# Patient Record
Sex: FEMALE | Race: OTHER | Employment: UNEMPLOYED | URBAN - METROPOLITAN AREA
[De-identification: names, ages, dates, MRNs, and addresses within clinical notes are randomized per-mention and may not be internally consistent; named-entity substitution may affect disease eponyms.]

---

## 2023-03-17 ENCOUNTER — APPOINTMENT (OUTPATIENT)
Dept: ULTRASOUND IMAGING | Age: 38
End: 2023-03-17
Attending: EMERGENCY MEDICINE

## 2023-03-17 ENCOUNTER — HOSPITAL ENCOUNTER (EMERGENCY)
Age: 38
Discharge: HOME | End: 2023-03-17
Attending: EMERGENCY MEDICINE

## 2023-03-17 ENCOUNTER — APPOINTMENT (OUTPATIENT)
Dept: CT IMAGING | Age: 38
End: 2023-03-17
Attending: EMERGENCY MEDICINE

## 2023-03-17 VITALS
RESPIRATION RATE: 16 BRPM | SYSTOLIC BLOOD PRESSURE: 153 MMHG | HEIGHT: 65 IN | OXYGEN SATURATION: 99 % | WEIGHT: 190 LBS | BODY MASS INDEX: 31.65 KG/M2 | TEMPERATURE: 98.8 F | HEART RATE: 79 BPM | DIASTOLIC BLOOD PRESSURE: 88 MMHG

## 2023-03-17 DIAGNOSIS — R10.31 ABDOMINAL PAIN, RIGHT LOWER QUADRANT: ICD-10-CM

## 2023-03-17 DIAGNOSIS — D25.9 UTERINE LEIOMYOMA, UNSPECIFIED LOCATION: Primary | ICD-10-CM

## 2023-03-17 DIAGNOSIS — N83.202 CYST OF LEFT OVARY: ICD-10-CM

## 2023-03-17 LAB
ALBUMIN SERPL-MCNC: 4.6 G/DL (ref 3.5–5.2)
ALBUMIN/GLOB SERPL: 1.2 (ref 1.1–2.2)
ALP SERPL-CCNC: 80 U/L (ref 35–104)
ALT SERPL-CCNC: 15 U/L (ref 10–35)
ANION GAP SERPL CALC-SCNC: 12 MMOL/L (ref 5–15)
AST SERPL-CCNC: 19 U/L (ref 10–35)
BASOPHILS # BLD: 0 K/UL (ref 0–1)
BASOPHILS NFR BLD: 0 % (ref 0–1)
BILIRUB SERPL-MCNC: 0.2 MG/DL (ref 0.2–1)
BUN SERPL-MCNC: 10 MG/DL (ref 6–20)
BUN/CREAT SERPL: 17 (ref 12–20)
CALCIUM SERPL-MCNC: 9.8 MG/DL (ref 8.6–10)
CHLORIDE SERPL-SCNC: 98 MMOL/L (ref 98–107)
CO2 SERPL-SCNC: 27 MMOL/L (ref 22–29)
COMMENT, HOLDF: NORMAL
COMMENT, HOLDF: NORMAL
CREAT SERPL-MCNC: 0.6 MG/DL (ref 0.5–0.9)
DIFFERENTIAL METHOD BLD: ABNORMAL
EOSINOPHIL # BLD: 0 K/UL (ref 0–0.4)
EOSINOPHIL NFR BLD: 0 %
ERYTHROCYTE [DISTWIDTH] IN BLOOD BY AUTOMATED COUNT: 13.9 % (ref 11.5–14.5)
GLOBULIN SER CALC-MCNC: 3.7 G/DL (ref 2–4)
GLUCOSE SERPL-MCNC: 98 MG/DL (ref 65–100)
HCG UR QL: NEGATIVE
HCT VFR BLD AUTO: 39.4 % (ref 35–47)
HGB BLD-MCNC: 12.8 G/DL (ref 11.5–16)
IMM GRANULOCYTES # BLD AUTO: 0 K/UL (ref 0–0.04)
IMM GRANULOCYTES NFR BLD AUTO: 0 % (ref 0–0.5)
LIPASE SERPL-CCNC: 25 U/L (ref 13–60)
LYMPHOCYTES # BLD: 2.5 K/UL (ref 0.8–3.5)
LYMPHOCYTES NFR BLD: 28 % (ref 12–49)
MCH RBC QN AUTO: 29.2 PG (ref 26–34)
MCHC RBC AUTO-ENTMCNC: 32.5 G/DL (ref 30–36.5)
MCV RBC AUTO: 89.7 FL (ref 80–99)
MONOCYTES # BLD: 0.5 K/UL (ref 0–1)
MONOCYTES NFR BLD: 5 % (ref 5–13)
NEUTS SEG # BLD: 5.8 K/UL (ref 1.8–8)
NEUTS SEG NFR BLD: 67 % (ref 32–75)
NRBC # BLD: 0 K/UL (ref 0–0.01)
NRBC BLD-RTO: 0 PER 100 WBC
PLATELET # BLD AUTO: 372 K/UL (ref 150–400)
PMV BLD AUTO: 9.4 FL (ref 8.9–12.9)
POTASSIUM SERPL-SCNC: 4.8 MMOL/L (ref 3.5–5.1)
PROT SERPL-MCNC: 8.3 G/DL (ref 6.4–8.3)
RBC # BLD AUTO: 4.39 M/UL (ref 3.8–5.2)
SAMPLES BEING HELD,HOLD: NORMAL
SAMPLES BEING HELD,HOLD: NORMAL
SODIUM SERPL-SCNC: 137 MMOL/L (ref 136–145)
WBC # BLD AUTO: 8.9 K/UL (ref 3.6–11)

## 2023-03-17 PROCEDURE — 76856 US EXAM PELVIC COMPLETE: CPT

## 2023-03-17 PROCEDURE — 74011250636 HC RX REV CODE- 250/636: Performed by: EMERGENCY MEDICINE

## 2023-03-17 PROCEDURE — 81001 URINALYSIS AUTO W/SCOPE: CPT

## 2023-03-17 PROCEDURE — 96374 THER/PROPH/DIAG INJ IV PUSH: CPT

## 2023-03-17 PROCEDURE — 85025 COMPLETE CBC W/AUTO DIFF WBC: CPT

## 2023-03-17 PROCEDURE — 74011000636 HC RX REV CODE- 636: Performed by: EMERGENCY MEDICINE

## 2023-03-17 PROCEDURE — 96375 TX/PRO/DX INJ NEW DRUG ADDON: CPT

## 2023-03-17 PROCEDURE — 99285 EMERGENCY DEPT VISIT HI MDM: CPT

## 2023-03-17 PROCEDURE — 83690 ASSAY OF LIPASE: CPT

## 2023-03-17 PROCEDURE — 74177 CT ABD & PELVIS W/CONTRAST: CPT

## 2023-03-17 PROCEDURE — 36415 COLL VENOUS BLD VENIPUNCTURE: CPT

## 2023-03-17 PROCEDURE — 80053 COMPREHEN METABOLIC PANEL: CPT

## 2023-03-17 RX ORDER — ONDANSETRON 2 MG/ML
4 INJECTION INTRAMUSCULAR; INTRAVENOUS
Status: COMPLETED | OUTPATIENT
Start: 2023-03-17 | End: 2023-03-17

## 2023-03-17 RX ORDER — ACETAMINOPHEN 325 MG/1
650 TABLET ORAL
Qty: 20 TABLET | Refills: 0 | Status: SHIPPED | OUTPATIENT
Start: 2023-03-17

## 2023-03-17 RX ORDER — NAPROXEN 500 MG/1
500 TABLET ORAL 2 TIMES DAILY WITH MEALS
Qty: 20 TABLET | Refills: 0 | Status: SHIPPED | OUTPATIENT
Start: 2023-03-17 | End: 2023-03-27

## 2023-03-17 RX ORDER — KETOROLAC TROMETHAMINE 30 MG/ML
15 INJECTION, SOLUTION INTRAMUSCULAR; INTRAVENOUS ONCE
Status: COMPLETED | OUTPATIENT
Start: 2023-03-17 | End: 2023-03-17

## 2023-03-17 RX ADMIN — IOPAMIDOL 100 ML: 755 INJECTION, SOLUTION INTRAVENOUS at 19:41

## 2023-03-17 RX ADMIN — KETOROLAC TROMETHAMINE 15 MG: 30 INJECTION, SOLUTION INTRAMUSCULAR; INTRAVENOUS at 19:22

## 2023-03-17 RX ADMIN — ONDANSETRON 4 MG: 2 INJECTION INTRAMUSCULAR; INTRAVENOUS at 19:22

## 2023-03-17 NOTE — ED TRIAGE NOTES
Ambulatory to triage for RLQ pain, nausea and dizziness starting 3 days ago. Denies diarrhea or fevers.

## 2023-03-17 NOTE — Clinical Note
[address] [place] [address] Dr JONATHAN [facility]  [address] [id number]  [phone]    Work/School Note    Date: 3/17/2023    To Whom It May concern:    Sheba Hedrick was seen and treated today in the emergency room by the following provider(s):  Attending Provider: Matt Ramon MD.      Sheba Hedrick is excused from work/school on 3/17/2023 through 3/19/2023. She is medically clear to return to work/school on 3/20/2023.          Sincerely,          Madhav De Jesus MD

## 2023-03-18 LAB
APPEARANCE UR: CLEAR
BACTERIA URNS QL MICRO: ABNORMAL /HPF
BILIRUB UR QL: NEGATIVE
COLOR UR: ABNORMAL
EPITH CASTS URNS QL MICRO: ABNORMAL /LPF
GLUCOSE UR STRIP.AUTO-MCNC: NEGATIVE MG/DL
HGB UR QL STRIP: ABNORMAL
HYALINE CASTS URNS QL MICRO: ABNORMAL /LPF
KETONES UR QL STRIP.AUTO: NEGATIVE MG/DL
LEUKOCYTE ESTERASE UR QL STRIP.AUTO: NEGATIVE
MUCOUS THREADS URNS QL MICRO: ABNORMAL /LPF
NITRITE UR QL STRIP.AUTO: NEGATIVE
PH UR STRIP: 6 (ref 5–8)
PROT UR STRIP-MCNC: NEGATIVE MG/DL
RBC #/AREA URNS HPF: ABNORMAL /HPF
SP GR UR REFRACTOMETRY: 1.02 (ref 1–1.03)
UR CULT HOLD, URHOLD: NORMAL
UROBILINOGEN UR QL STRIP.AUTO: 0.2 EU/DL (ref 0.2–1)
WBC URNS QL MICRO: ABNORMAL /HPF (ref 0–4)

## 2023-03-18 NOTE — DISCHARGE INSTRUCTIONS
Take Naprosyn and Tylenol for pain. Return for new or worsening symptoms.   Follow-up with gynecology

## 2023-03-24 NOTE — ED PROVIDER NOTES
History of Present Illness: This is a pleasant overall well-appearing 66-year-old female presenting with RLQ pain, nausea and dizziness starting 3 days ago. Denies diarrhea or fevers. History reviewed. No pertinent past medical history. History reviewed. No pertinent surgical history. History reviewed. No pertinent family history. Social History     Socioeconomic History    Marital status: SINGLE     Spouse name: Not on file    Number of children: Not on file    Years of education: Not on file    Highest education level: Not on file   Occupational History    Not on file   Tobacco Use    Smoking status: Not on file    Smokeless tobacco: Not on file   Substance and Sexual Activity    Alcohol use: Not on file    Drug use: Not on file    Sexual activity: Not on file   Other Topics Concern    Not on file   Social History Narrative    Not on file     Social Determinants of Health     Financial Resource Strain: Not on file   Food Insecurity: Not on file   Transportation Needs: Not on file   Physical Activity: Not on file   Stress: Not on file   Social Connections: Not on file   Intimate Partner Violence: Not on file   Housing Stability: Not on file         ALLERGIES: Patient has no known allergies. Review of Systems   Gastrointestinal:  Positive for abdominal pain. Genitourinary:  Positive for pelvic pain. Negative for menstrual problem. All other systems reviewed and are negative. Vitals:    03/17/23 1831   BP: (!) 153/88   Pulse: 79   Resp: 16   Temp: 98.8 °F (37.1 °C)   SpO2: 99%   Weight: 86.2 kg (190 lb)   Height: 5' 5\" (1.651 m)            Physical Exam  Vitals and nursing note reviewed. Constitutional:       General: She is in acute distress (Appears in pain). Appearance: She is normal weight. She is not ill-appearing, toxic-appearing or diaphoretic. HENT:      Head: Normocephalic and atraumatic.       Right Ear: External ear normal.      Left Ear: External ear normal.      Nose: Nose normal. No congestion or rhinorrhea. Mouth/Throat:      Mouth: Mucous membranes are moist.      Pharynx: Oropharynx is clear. No oropharyngeal exudate or posterior oropharyngeal erythema. Eyes:      General: No scleral icterus. Extraocular Movements: Extraocular movements intact. Conjunctiva/sclera: Conjunctivae normal.   Cardiovascular:      Rate and Rhythm: Normal rate and regular rhythm. Pulses: Normal pulses. Heart sounds: Normal heart sounds. No murmur heard. No gallop. Pulmonary:      Effort: Pulmonary effort is normal. No respiratory distress. Breath sounds: Normal breath sounds. Abdominal:      General: Abdomen is flat. Bowel sounds are normal. There is no distension. Palpations: Abdomen is soft. Tenderness: There is abdominal tenderness in the right lower quadrant. There is no guarding or rebound. Genitourinary:     Comments: Patient declined  Musculoskeletal:         General: No swelling, tenderness or deformity. Normal range of motion. Cervical back: Normal range of motion and neck supple. No rigidity or tenderness. Right lower leg: No edema. Left lower leg: No edema. Lymphadenopathy:      Cervical: No cervical adenopathy. Skin:     General: Skin is warm. Capillary Refill: Capillary refill takes less than 2 seconds. Coloration: Skin is not jaundiced. Findings: No bruising or erythema. Neurological:      General: No focal deficit present. Mental Status: She is alert and oriented to person, place, and time. Cranial Nerves: No cranial nerve deficit. Motor: No weakness. Psychiatric:         Mood and Affect: Mood normal.         Thought Content:  Thought content normal.        Recent Results (from the past 200 hour(s))   SAMPLES BEING HELD    Collection Time: 03/17/23  6:47 PM   Result Value Ref Range    SAMPLES BEING HELD 1UR 1UCHOLD     COMMENT        Add-on orders for these samples will be processed based on acceptable specimen integrity and analyte stability, which may vary by analyte. URINALYSIS W/MICROSCOPIC    Collection Time: 03/17/23  6:47 PM   Result Value Ref Range    Color YELLOW/STRAW      Appearance CLEAR CLEAR      Specific gravity 1.025 1.003 - 1.030      pH (UA) 6.0 5.0 - 8.0      Protein Negative NEG mg/dL    Glucose Negative NEG mg/dL    Ketone Negative NEG mg/dL    Bilirubin Negative NEG      Blood TRACE (A) NEG      Urobilinogen 0.2 0.2 - 1.0 EU/dL    Nitrites Negative NEG      Leukocyte Esterase Negative NEG      WBC 5-10 0 - 4 /hpf    RBC 5-10 /hpf    Epithelial cells MANY (A) FEW /lpf    Bacteria 3+ (A) NEG /hpf    Mucus 3+ (A) NEG /lpf    Hyaline cast 0-2 (A) NEG /lpf   URINE CULTURE HOLD SAMPLE    Collection Time: 03/17/23  6:47 PM    Specimen: Urine   Result Value Ref Range    Urine culture hold        Urine on hold in Microbiology dept for 2 days. If unpreserved urine is submitted, it cannot be used for addtional testing after 24 hours, recollection will be required. LIPASE    Collection Time: 03/17/23  7:28 PM   Result Value Ref Range    Lipase 25 13 - 60 U/L   METABOLIC PANEL, COMPREHENSIVE    Collection Time: 03/17/23  7:28 PM   Result Value Ref Range    Sodium 137 136 - 145 mmol/L    Potassium 4.8 3.5 - 5.1 mmol/L    Chloride 98 98 - 107 mmol/L    CO2 27 22 - 29 mmol/L    Anion gap 12 5 - 15 mmol/L    Glucose 98 65 - 100 mg/dL    BUN 10 6 - 20 MG/DL    Creatinine 0.60 0.50 - 0.90 MG/DL    BUN/Creatinine ratio 17 12 - 20      eGFR >60 >60 ml/min/1.73m2    Calcium 9.8 8.6 - 10.0 MG/DL    Bilirubin, total 0.2 0.2 - 1.0 MG/DL    ALT (SGPT) 15 10 - 35 U/L    AST (SGOT) 19 10 - 35 U/L    Alk.  phosphatase 80 35 - 104 U/L    Protein, total 8.3 6.4 - 8.3 g/dL    Albumin 4.6 3.5 - 5.2 g/dL    Globulin 3.7 2.0 - 4.0 g/dL    A-G Ratio 1.2 1.1 - 2.2     CBC WITH AUTOMATED DIFF    Collection Time: 03/17/23  7:28 PM   Result Value Ref Range    WBC 8.9 3.6 - 11.0 K/uL    RBC 4.39 3.80 - 5.20 M/uL    HGB 12.8 11.5 - 16.0 g/dL    HCT 39.4 35.0 - 47.0 %    MCV 89.7 80.0 - 99.0 FL    MCH 29.2 26.0 - 34.0 PG    MCHC 32.5 30.0 - 36.5 g/dL    RDW 13.9 11.5 - 14.5 %    PLATELET 511 934 - 547 K/uL    MPV 9.4 8.9 - 12.9 FL    NRBC 0.0 0  WBC    ABSOLUTE NRBC 0.00 0.00 - 0.01 K/uL    NEUTROPHILS 67 32 - 75 %    LYMPHOCYTES 28 12 - 49 %    MONOCYTES 5 5 - 13 %    EOSINOPHILS 0 (L) 7 %    BASOPHILS 0 0 - 1 %    IMMATURE GRANULOCYTES 0 0 - 0.5 %    ABS. NEUTROPHILS 5.8 1.8 - 8.0 K/UL    ABS. LYMPHOCYTES 2.5 0.8 - 3.5 K/UL    ABS. MONOCYTES 0.5 0.0 - 1.0 K/UL    ABS. EOSINOPHILS 0.0 0.0 - 0.4 K/UL    ABS. BASOPHILS 0.0 0 - 1 K/UL    ABS. IMM. GRANS. 0.0 0.00 - 0.04 K/UL    DF AUTOMATED     SAMPLES BEING HELD    Collection Time: 03/17/23  7:28 PM   Result Value Ref Range    SAMPLES BEING HELD 1BLUE     COMMENT        Add-on orders for these samples will be processed based on acceptable specimen integrity and analyte stability, which may vary by analyte. HCG URINE, QL. - POC    Collection Time: 03/17/23  7:29 PM   Result Value Ref Range    Pregnancy test,urine (POC) Negative NEG         US PELV NON OBS   Final Result      1. Enlarged myomatous uterus. 2. There is a 3 cm cyst of the LEFT ovary, likely physiological.      CT ABD PELV W CONT   Final Result   Fibroid uterus   Complex left ovarian cyst          Medical Decision Making  Abdominal pain differential  I considered GERD, gastritis, pancreatitis, hepatitis, cholecystitis, peptic ulcer disease, esophagitis, esophageal spasm, constipation, obstipation, gastroenteritis, pleurisy, pericarditis, myocarditis, pneumonia, empyema, colitis, proctitis, diverticulitis, appendicitis, mesenteric adenitis, terminal ileitis, mesenteric ischemia, choledocholithiasis, ascending cholangitis, primary biliary sclerosis, urinary tract infection, kidney stone, pyelonephritis, intra-abdominal infarctions and other causes.     Pelvic pain differential  I considered the possibility of ovarian cyst, ovarian torsion, ectopic pregnancy, urinary tract infection, STDs and tubo-ovarian abscess, cervicitis, Gregg-Pranay Efrain disease, uterine fibroids, normal intrauterine pregnancy, appendicitis, kidney stones, urinary tract infection, terminal ileitis, mesenteric adenitis, constipation, obstipation, diverticulitis, colitis, endometriosis and other causes      Problems Addressed:  Abdominal pain, right lower quadrant: complicated acute illness or injury with systemic symptoms  Cyst of left ovary: acute illness or injury  Uterine leiomyoma, unspecified location: complicated acute illness or injury with systemic symptoms    Amount and/or Complexity of Data Reviewed  Independent Historian: friend  External Data Reviewed: notes. Details: No prior visits  Labs: ordered. Details: Urinalysis appears contaminated otherwise normal.  CBC and comprehensive metabolic panel including liver functions, electrolytes and kidney functions all completely normal.  Lipase normal.  Urine pregnancy test negative. Radiology: ordered and independent interpretation performed. Details: CT of the abdomen pelvis negative for appendicitis, it does show a fibroid uterus and left ovarian cyst.  Ultrasound confirms these findings. No ovarian torsion. Discussion of management or test interpretation with external provider(s): I discussed the findings of ultrasound with the ultrasonographer in real-time. Risk  OTC drugs. Prescription drug management. Decision regarding hospitalization. Risk Details: The patient ultiumately did not warrant hospitalization nor any acute surgical intervention, I considered the need for both. Also considered the need to obtain additional imaging and/or labs beyond what may have been ordered but no additional testing was indicated based on the patient's condition and results of any other testing that may have been performed.   Any medications given here and/or prescribed for discharge are documented within the other portions of the note. After any medications or treatments that may have been provided here the patient was improved and symptoms had resolved or become tolerable or no medications or treatments were indicated based on the patient's condition. The patient was deemed stable safe and appropriate for discharge to home and is instructed to follow-up with his primary care doctor and return for any new or worsening symptoms. Procedures    Medications   ondansetron (ZOFRAN) injection 4 mg (4 mg IntraVENous Given 3/17/23 1922)   ketorolac (TORADOL) injection 15 mg (15 mg IntraVENous Given 3/17/23 1922)   iopamidoL (ISOVUE-370) 370 mg iodine /mL (76 %) injection 100 mL (100 mL IntraVENous Given 3/17/23 1941)          My Medications        START taking these medications        Instructions Each Dose to Equal Morning Noon Evening Bedtime   acetaminophen 325 mg tablet  Commonly known as: TYLENOL    Your last dose was: Your next dose is: Take 2 Tablets by mouth every four (4) hours as needed for Pain. 650 mg                 naproxen 500 mg tablet  Commonly known as: Naprosyn    Your last dose was: Your next dose is: Take 1 Tablet by mouth two (2) times daily (with meals) for 10 days. 500 mg                           Where to Get Your Medications        Information on where to get these meds will be given to you by the nurse or doctor. Ask your nurse or doctor about these medications  acetaminophen 325 mg tablet  naproxen 500 mg tablet         Encounter Diagnoses     ICD-10-CM ICD-9-CM   1. Uterine leiomyoma, unspecified location  D25.9 218.9   2. Cyst of left ovary  N83.202 620.2   3.  Abdominal pain, right lower quadrant  R10.31 789.03       Follow-up Information       Follow up With Specialties Details Why [address] [facility] Emergency Medicine  As needed, If symptoms worsen [address] [facility] Deepthi Milton,  Obstetrics & Gynecology, Obstetrics, Gynecology Schedule an appointment as soon as possible for a visit   [address] [place] [address] Drive  [phone]      Jamal Sheets MD Obstetrics & Gynecology, Gynecology, Obstetrics Schedule an appointment as soon as possible for a visit   [phone]              DISPOSITION:  Discharged